# Patient Record
(demographics unavailable — no encounter records)

---

## 2025-01-16 NOTE — ASSESSMENT
[FreeTextEntry1] : In summary, the patient is a 21-year-old man with hypertension and tachycardia.  He does appear to have some symptoms due to his hypertension.  In addition I am somewhat concerned given his appearance over the possibility of a Marfan's syndrome.  Due to the latter I have ordered an echocardiogram to assess for aortic root and aortic dilatation  In the meantime have begun him on metoprolol 25 mg/day  I have sent him for full blood work as well.

## 2025-01-16 NOTE — REASON FOR VISIT
[Arrhythmia/ECG Abnorrmalities] : arrhythmia/ECG abnormalities [Hypertension] : hypertension [FreeTextEntry1] : This is a 21-year-old man who presents for evaluation.  The patient states his blood pressure is high he states he takes it from time to time he is accompanied by his girlfriend who is standing by his side and holding his hand the patient at times is laughing inappropriately.  Of note is the fact that he states he gets palpitations as well.  He states he sometimes knows that his pressure is elevated because he gets headaches and nausea.  He has no history of smoking he has no known history of diabetes although he has not had blood work at any time in the recent past he is a nondrug and nonalcohol user.  Family history significant for his parents having diabetes.  Of note is the fact that the patient at 6 foot 1 does have rather long fingers.

## 2025-01-16 NOTE — PHYSICAL EXAM
[5th Left ICS - MCL] : palpated at the 5th LICS in the midclavicular line [Normal] : normal [Normal Rate] : normal [Rhythm Regular] : regular [No Murmur] : no murmurs heard